# Patient Record
Sex: FEMALE | Race: WHITE
[De-identification: names, ages, dates, MRNs, and addresses within clinical notes are randomized per-mention and may not be internally consistent; named-entity substitution may affect disease eponyms.]

---

## 2017-02-16 NOTE — MAMMOGRAPHY REPORT
BILATERAL DIGITAL DIAGNOSTIC MAMMOGRAM TOMOSYNTHESIS WITH CAD: 2/16/2017

CLINICAL HISTORY: Short interval follow-up of left breast calcifications.  Due for routine mammograp
hy of the right breast.  





TECHNIQUE:  Breast tomosynthesis in addition to standard 2D mammography was performed. Current study
 was also evaluated with a Computer Aided Detection (CAD) system.  Bilateral CC and MLO 2-D and osmany
synthesis images and spot magnification left CC and ML views were obtained.



COMPARISON: Comparison is made to exams dated:  8/15/2016 mammogram, 2/15/2016 mammogram, 2/5/2016 m
ammogram, 2/15/2016 ultrasound, 2/4/2015 mammogram, and 2/3/2014 mammogram - [facility]
enter.   



BREAST COMPOSITION:  There are scattered areas of fibroglandular density in both breasts.  



FINDINGS:  Again noted is a lobulated circumscribed 11 mm mass seen within the left upper inner quad
rant, which demonstrates internal layering calcification, and was previously shown to represent a be
nign cyst on the prior 2016 ultrasound exam.  Again noted are several groupings and clusters of calc
ifications within the left breast, many of which demonstrate layering on the lateral view.  The smal
l 2 mm cluster of punctate benign-appearing calcifications in the left upper outer quadrant is stabl
e on spot magnification views dating back to February 2016, and in retrospect are likely present mony
ing back to the 2012 exam on the cc view.  Given the morphology and long-term stability, the calcifi
cations are considered benign.  The other previously described grouped calcifications in the left up
per outer quadrant are also stable compared to spot magnification views dating back to February 2016
, and in retrospect are likely stable dating back to the 2014 exam.  On the current exam, the majori
ty of the calcifications demonstrate layering, consistent with benign milk of calcium.  No new or in
creasing calcifications are noted on the current spot magnification views.



The remainder of both breasts are stable compared to prior exams, without suspicious masses, calcifi
cations, or areas of architectural distortion noted.  Other bilateral benign-appearing calcification
s are not significantly changed.



IMPRESSION:  ACR BI-RADS CATEGORY 2: BENIGN

Groupings of calcifications in the left breast are stable on spot magnification views dating back to
 February 2016; the majority of the calcifications demonstrate layering, consistent with benign milk
 of calcium.  The calcifications are considered benign given the morphology and stability.  There is
 no mammographic evidence of malignancy in either breast. A 1 year screening mammogram is recommende
d.  The patient has been verbally notified of the results.  





Approximately 10% of breast cancers are not detected with mammography. A negative mammographic repor
t should not delay biopsy if a clinically suggestive mass is present.



Kavita Comer M.D.          

ah/:2/16/2017 09:27:31  



Imaging Technologist: Elza GAN(R)(M), [facility]

letter sent: Normal 1/2  

BI-RADS Code: ACR BI-RADS Category 2: Benign

## 2017-12-07 ENCOUNTER — HOSPITAL ENCOUNTER (OUTPATIENT)
Dept: HOSPITAL 45 - C.LAB1850 | Age: 62
Discharge: HOME | End: 2017-12-07
Attending: NURSE PRACTITIONER
Payer: COMMERCIAL

## 2017-12-07 DIAGNOSIS — R73.01: ICD-10-CM

## 2017-12-07 DIAGNOSIS — E07.9: Primary | ICD-10-CM

## 2017-12-07 LAB
CHOLEST/HDLC SERPL: 4 {RATIO}
EOSINOPHIL NFR BLD AUTO: 179 K/UL (ref 130–400)
GLUCOSE UR QL: 50 MG/DL
HCT VFR BLD CALC: 42.4 % (ref 37–47)
KETONES UR QL STRIP: 116 MG/DL
MCH RBC QN AUTO: 30.8 PG (ref 25–34)
MCHC RBC AUTO-ENTMCNC: 34 G/DL (ref 32–36)
MCV RBC AUTO: 90.6 FL (ref 80–100)
NITRITE UR QL STRIP: 166 MG/DL (ref 0–150)
PH UR: 199 MG/DL (ref 0–200)
PMV BLD AUTO: 10.6 FL (ref 7.4–10.4)
RBC # BLD AUTO: 4.68 M/UL (ref 4.2–5.4)
TSH SERPL-ACNC: 3.13 UIU/ML (ref 0.3–4.5)
VERY LOW DENSITY LIPOPROT CALC: 33 MG/DL
WBC # BLD AUTO: 5.03 K/UL (ref 4.8–10.8)

## 2018-08-16 ENCOUNTER — HOSPITAL ENCOUNTER (OUTPATIENT)
Dept: HOSPITAL 45 - C.LAB1850 | Age: 63
Discharge: HOME | End: 2018-08-16
Attending: NURSE PRACTITIONER
Payer: COMMERCIAL

## 2018-08-16 DIAGNOSIS — R19.7: Primary | ICD-10-CM

## 2018-08-16 DIAGNOSIS — R10.84: ICD-10-CM

## 2018-08-16 LAB
BASOPHILS # BLD: 0.03 K/UL (ref 0–0.2)
BASOPHILS NFR BLD: 0.3 %
BUN SERPL-MCNC: 11 MG/DL (ref 7–18)
CALCIUM SERPL-MCNC: 9.1 MG/DL (ref 8.5–10.1)
CO2 SERPL-SCNC: 27 MMOL/L (ref 21–32)
CREAT SERPL-MCNC: 1.12 MG/DL (ref 0.6–1.2)
EOS ABS #: 0.1 K/UL (ref 0–0.5)
EOSINOPHIL NFR BLD AUTO: 210 K/UL (ref 130–400)
GLUCOSE SERPL-MCNC: 103 MG/DL (ref 70–99)
HCT VFR BLD CALC: 41.1 % (ref 37–47)
HGB BLD-MCNC: 14 G/DL (ref 12–16)
IG#: 0.02 K/UL (ref 0–0.02)
IMM GRANULOCYTES NFR BLD AUTO: 14.9 %
LYMPHOCYTES # BLD: 1.53 K/UL (ref 1.2–3.4)
MCH RBC QN AUTO: 29.8 PG (ref 25–34)
MCHC RBC AUTO-ENTMCNC: 34.1 G/DL (ref 32–36)
MCV RBC AUTO: 87.4 FL (ref 80–100)
MONO ABS #: 0.79 K/UL (ref 0.11–0.59)
MONOCYTES NFR BLD: 7.7 %
NEUT ABS #: 7.83 K/UL (ref 1.4–6.5)
NEUTROPHILS # BLD AUTO: 1 %
NEUTROPHILS NFR BLD AUTO: 75.9 %
PMV BLD AUTO: 10.7 FL (ref 7.4–10.4)
POTASSIUM SERPL-SCNC: 3.6 MMOL/L (ref 3.5–5.1)
RED CELL DISTRIBUTION WIDTH CV: 14.2 % (ref 11.5–14.5)
RED CELL DISTRIBUTION WIDTH SD: 45.8 FL (ref 36.4–46.3)
SODIUM SERPL-SCNC: 142 MMOL/L (ref 136–145)
WBC # BLD AUTO: 10.3 K/UL (ref 4.8–10.8)

## 2018-08-18 ENCOUNTER — HOSPITAL ENCOUNTER (OUTPATIENT)
Dept: HOSPITAL 45 - C.LAB | Age: 63
Discharge: HOME | End: 2018-08-18
Attending: NURSE PRACTITIONER
Payer: COMMERCIAL

## 2018-08-18 DIAGNOSIS — R19.7: Primary | ICD-10-CM

## 2018-08-18 DIAGNOSIS — R10.84: ICD-10-CM

## 2018-08-20 ENCOUNTER — HOSPITAL ENCOUNTER (OUTPATIENT)
Dept: HOSPITAL 45 - C.CTS | Age: 63
Discharge: HOME | End: 2018-08-20
Attending: NURSE PRACTITIONER
Payer: COMMERCIAL

## 2018-08-20 DIAGNOSIS — R19.7: Primary | ICD-10-CM

## 2018-08-20 DIAGNOSIS — R10.84: ICD-10-CM

## 2018-08-20 NOTE — DIAGNOSTIC IMAGING REPORT
CT ABD/PELVIS IV AND ORAL CONT



CLINICAL HISTORY: R19.7 [id number] Abdominal pain, diffuse    



COMPARISON STUDY:  None.



TECHNIQUE: Following the IV administration of 93 mL of Optiray-320, CT scan of

the abdomen and pelvis was performed from the lung bases to the proximal femurs.

Images are reviewed in the axial, sagittal, and coronal planes. IV contrast was

administered without complication.  A dose lowering technique was utilized

adhering to the principles of ALARA.





CT DOSE: 793.93 mGycm



FINDINGS:



Lower chest: The heart is normal in size and configuration, without pericardial

effusion. The lung bases and pleural spaces are clear.



Liver: There is mild hepatic steatosis. No focal masses are visualized.



Gallbladder: Cholelithiasis



Spleen: Normal in size and attenuation.



Pancreas: Unremarkable.



Adrenal glands: There is a 14 mm left adrenal nodule with Hounsfield attenuation

value of 75



Kidneys: There is a nonobstructing 4 mm right renal calculus. There are left

parapelvic cysts. There is a 7 mm left renal cortical cyst. There is no

significant hydronephrosis.



Bowel: There are no transition zones indicate bowel obstruction. Although the

appendix is enlarged measuring 8 mm, there is gas within the lumen and there are

no periappendiceal inflammatory changes. This is felt to represent a normal

variant. There is colonic diverticulosis. There are minor peridiverticular

inflammatory changes within the sigmoid.



Peritoneum: There is no intraperitoneal free air or abdominal ascites.



Vasculature: The abdominal aorta is normal in course and caliber.



Adenopathy: There is subtle increased density of the central mesenteric fat with

minimally prominent mesenteric lymph nodes, likely representing a mild

nonspecific mesenteritis.



Pelvic viscera: The bladder, and pelvic viscera are unremarkable.



Skeletal structures: No destructive osseous lesions are seen.



IMPRESSION:  

1. Very subtle sigmoid diverticulitis

2. No evidence of bowel obstruction. No evidence of free air

3. No evidence of acute appendicitis

4. Cholelithiasis

5. Subtle increased density of the central mesentery with mildly prominent

mesenteric lymph nodes, likely representing a mild mesenteritis







Electronically signed by:  Apolinar Colindres M.D.

8/20/2018 2:08 PM



Dictated Date/Time:  8/20/2018 1:59 PM